# Patient Record
Sex: FEMALE | Race: WHITE | Employment: FULL TIME | URBAN - METROPOLITAN AREA
[De-identification: names, ages, dates, MRNs, and addresses within clinical notes are randomized per-mention and may not be internally consistent; named-entity substitution may affect disease eponyms.]

---

## 2018-10-17 NOTE — PROGRESS NOTES
Genny Johnanna Small    Age 60 y.o.    female    1955    MRN [id number]    Date___________   Arrival Time_____________  OR Time____________Duration____     Procedure(s):  PHACOEMULSIFICATION OF CATARACT RIGHT EYE WITH INTRAOCULAR LENS IMPLANT    Surgeon  ________________________________  Floretta Kocher   General   [place]       Phone [phone] (home) [phone] (work)      [address]  Cell Work  ______________________________________________________________________________________________________________________________________________________________________________________________________________________________________________________________________________________________________________________________________________________________    PCP__________________________Phone__________________________________      H&P__________________Bringing      Chart              Epic    DOS           Called_______  EKG__________________Bringing      Chart              Epic    DOS           Called_______  LAB__________________ Bringing      Chart              Epic    DOS           Called_______  CardiacClearance _______Bringing      Chart              Epic    DOS           Called_______      Cardiologist________________________ Phone___________________________      ? Restoration concerns / Waiver on Chart            PAT Communications________________  ? Pre-op Instructions Given [place]          _________________________________  ? Directions to Surgery Center                          _________________________________  ? Transportation Home_______________      _________________________________  ?  Crutches/Walker__________________        _________________________________      ________Pre-op Orders   _______Transcribed    _______Wt.  ________Pharmacy          _______SCD  ______VTE     ______Beta Blocker

## 2018-10-22 RX ORDER — LEVOTHYROXINE SODIUM 0.07 MG/1
TABLET ORAL
Refills: 2 | COMMUNITY
Start: 2018-09-06

## 2018-10-22 NOTE — PROGRESS NOTES
Obstructive Sleep Apnea (MICHELLE) Screening     Patient:  Sandra Baig    YOB: 1955      Medical Record #:  [id number]                     Date:  10/22/2018     1. Are you a loud and/or regular snorer? []  Yes       [x] No    2. Have you been observed to gasp or stop breathing during sleep? []  Yes       [x] No    3. Do you feel tired or groggy upon awakening or do you awaken with a headache?           []  Yes       [] No    4. Are you often tired or fatigued during the wake time hours? []  Yes       [] No    5. Do you fall asleep sitting, reading, watching TV or driving? []  Yes       [] No    6. Do you often have problems with memory or concentration? []  Yes       [] No    **If patient's score is ? 3 they are considered high risk for MICHELLE. Notify the anesthesiologist of the high risk and document in focus note. Note:  If the patient's BMI is more than 35 kg m¯² , has neck circumference > 40 cm, and/or high blood pressure the risk is greater (© American Sleep Apnea Association, 2006).

## 2018-10-24 ENCOUNTER — ANESTHESIA EVENT (OUTPATIENT)
Dept: OPERATING ROOM | Age: 63
End: 2018-10-24
Payer: COMMERCIAL

## 2018-10-25 ENCOUNTER — ANESTHESIA (OUTPATIENT)
Dept: OPERATING ROOM | Age: 63
End: 2018-10-25
Payer: COMMERCIAL

## 2018-10-25 ENCOUNTER — HOSPITAL ENCOUNTER (OUTPATIENT)
Age: 63
Setting detail: OUTPATIENT SURGERY
Discharge: HOME | End: 2018-10-25
Attending: OPHTHALMOLOGY | Admitting: OPHTHALMOLOGY
Payer: COMMERCIAL

## 2018-10-25 VITALS — SYSTOLIC BLOOD PRESSURE: 154 MMHG | DIASTOLIC BLOOD PRESSURE: 79 MMHG | OXYGEN SATURATION: 98 %

## 2018-10-25 VITALS
DIASTOLIC BLOOD PRESSURE: 69 MMHG | RESPIRATION RATE: 16 BRPM | SYSTOLIC BLOOD PRESSURE: 129 MMHG | TEMPERATURE: 97.1 F | WEIGHT: 168 LBS | HEART RATE: 80 BPM | BODY MASS INDEX: 27 KG/M2 | HEIGHT: 66 IN | OXYGEN SATURATION: 93 %

## 2018-10-25 LAB
GLUCOSE BLD-MCNC: 209 MG/DL (ref 70–99)
PERFORMED ON: ABNORMAL

## 2018-10-25 PROCEDURE — 3700000001 HC ADD 15 MINUTES (ANESTHESIA): Performed by: OPHTHALMOLOGY

## 2018-10-25 PROCEDURE — 7100000011 HC PHASE II RECOVERY - ADDTL 15 MIN: Performed by: OPHTHALMOLOGY

## 2018-10-25 PROCEDURE — 6360000002 HC RX W HCPCS: Performed by: NURSE ANESTHETIST, CERTIFIED REGISTERED

## 2018-10-25 PROCEDURE — 2500000003 HC RX 250 WO HCPCS: Performed by: OPHTHALMOLOGY

## 2018-10-25 PROCEDURE — 3600000003 HC SURGERY LEVEL 3 BASE: Performed by: OPHTHALMOLOGY

## 2018-10-25 PROCEDURE — 2580000003 HC RX 258: Performed by: ANESTHESIOLOGY

## 2018-10-25 PROCEDURE — 7100000010 HC PHASE II RECOVERY - FIRST 15 MIN: Performed by: OPHTHALMOLOGY

## 2018-10-25 PROCEDURE — 3700000000 HC ANESTHESIA ATTENDED CARE: Performed by: OPHTHALMOLOGY

## 2018-10-25 PROCEDURE — 6360000002 HC RX W HCPCS: Performed by: OPHTHALMOLOGY

## 2018-10-25 PROCEDURE — V2632 POST CHMBR INTRAOCULAR LENS: HCPCS | Performed by: OPHTHALMOLOGY

## 2018-10-25 PROCEDURE — 3600000013 HC SURGERY LEVEL 3 ADDTL 15MIN: Performed by: OPHTHALMOLOGY

## 2018-10-25 PROCEDURE — 2580000003 HC RX 258: Performed by: OPHTHALMOLOGY

## 2018-10-25 PROCEDURE — 2709999900 HC NON-CHARGEABLE SUPPLY: Performed by: OPHTHALMOLOGY

## 2018-10-25 PROCEDURE — 6370000000 HC RX 637 (ALT 250 FOR IP): Performed by: OPHTHALMOLOGY

## 2018-10-25 DEVICE — ACRYSOF(R) IQ ASPHERIC IOL SP ACRYLIC FOLDABLELENS WULTRASERT(TM) DELIVERY SYSTEM UV WBLUE LIGHT FILTER. 13.0MM LENGTH 6.0MM ANTERIORASYMMETRIC BICONVEX OPTIC PLANAR HAPTICS.
Type: IMPLANTABLE DEVICE | Site: LENS | Status: FUNCTIONAL
Brand: ACRYSOF ULTRASERT

## 2018-10-25 RX ORDER — FENTANYL CITRATE 50 UG/ML
INJECTION, SOLUTION INTRAMUSCULAR; INTRAVENOUS PRN
Status: DISCONTINUED | OUTPATIENT
Start: 2018-10-25 | End: 2018-10-25 | Stop reason: SDUPTHER

## 2018-10-25 RX ORDER — SODIUM CHLORIDE 0.9 % (FLUSH) 0.9 %
10 SYRINGE (ML) INJECTION PRN
Status: DISCONTINUED | OUTPATIENT
Start: 2018-10-25 | End: 2018-10-25 | Stop reason: HOSPADM

## 2018-10-25 RX ORDER — SODIUM CHLORIDE 0.9 % (FLUSH) 0.9 %
10 SYRINGE (ML) INJECTION EVERY 12 HOURS SCHEDULED
Status: DISCONTINUED | OUTPATIENT
Start: 2018-10-25 | End: 2018-10-25 | Stop reason: HOSPADM

## 2018-10-25 RX ORDER — SODIUM CHLORIDE, SODIUM LACTATE, POTASSIUM CHLORIDE, CALCIUM CHLORIDE 600; 310; 30; 20 MG/100ML; MG/100ML; MG/100ML; MG/100ML
INJECTION, SOLUTION INTRAVENOUS CONTINUOUS
Status: DISCONTINUED | OUTPATIENT
Start: 2018-10-25 | End: 2018-10-25 | Stop reason: HOSPADM

## 2018-10-25 RX ORDER — LIDOCAINE HYDROCHLORIDE 10 MG/ML
1 INJECTION, SOLUTION EPIDURAL; INFILTRATION; INTRACAUDAL; PERINEURAL
Status: DISCONTINUED | OUTPATIENT
Start: 2018-10-25 | End: 2018-10-25 | Stop reason: HOSPADM

## 2018-10-25 RX ORDER — MIDAZOLAM HYDROCHLORIDE 1 MG/ML
INJECTION INTRAMUSCULAR; INTRAVENOUS PRN
Status: DISCONTINUED | OUTPATIENT
Start: 2018-10-25 | End: 2018-10-25 | Stop reason: SDUPTHER

## 2018-10-25 RX ORDER — MAGNESIUM HYDROXIDE 1200 MG/15ML
LIQUID ORAL PRN
Status: DISCONTINUED | OUTPATIENT
Start: 2018-10-25 | End: 2018-10-25 | Stop reason: HOSPADM

## 2018-10-25 RX ADMIN — Medication 0.3 ML: at 06:40

## 2018-10-25 RX ADMIN — Medication 0.3 ML: at 06:49

## 2018-10-25 RX ADMIN — MIDAZOLAM HYDROCHLORIDE 2 MG: 2 INJECTION, SOLUTION INTRAMUSCULAR; INTRAVENOUS at 07:24

## 2018-10-25 RX ADMIN — SODIUM CHLORIDE, POTASSIUM CHLORIDE, SODIUM LACTATE AND CALCIUM CHLORIDE: 600; 310; 30; 20 INJECTION, SOLUTION INTRAVENOUS at 06:43

## 2018-10-25 RX ADMIN — Medication 0.3 ML: at 06:31

## 2018-10-25 RX ADMIN — FENTANYL CITRATE 50 MCG: 50 INJECTION INTRAMUSCULAR; INTRAVENOUS at 07:24

## 2018-10-25 ASSESSMENT — PULMONARY FUNCTION TESTS
PIF_VALUE: 0

## 2018-10-25 ASSESSMENT — PAIN SCALES - GENERAL: PAINLEVEL_OUTOF10: 0

## 2018-10-25 ASSESSMENT — PAIN - FUNCTIONAL ASSESSMENT: PAIN_FUNCTIONAL_ASSESSMENT: 0-10

## 2018-10-25 NOTE — PROGRESS NOTES
POCT      Blood Glucose:      (Normal Range 70-99)  209    PT:      (Normal Range 9.8 - 13)    INR:      (Normal Range 0.86-1.14)

## 2018-10-25 NOTE — ANESTHESIA PRE PROCEDURE
Department of Anesthesiology  Preprocedure Note       Name:  Humaira Hilliard   Age:  60 y.o.  :  1955                                          MRN:  [id number]         Date:  10/25/2018      Surgeon: Terry Stephens):  Nidia Nice MD    Procedure: PHACOEMULSIFICATION OF CATARACT RIGHT EYE WITH INTRAOCULAR LENS IMPLANT (Right Eye)    Medications prior to admission:   Prior to Admission medications    Medication Sig Start Date End Date Taking? Authorizing Provider   levothyroxine (SYNTHROID) 75 MCG tablet TAKE ONE TABLET BY MOUTH EVERY DAY 18  Yes Historical Provider, MD   metFORMIN (GLUCOPHAGE) 1000 MG tablet Take 1,000 mg by mouth 2 times daily (with meals). Yes Historical Provider, MD   Canagliflozin (INVOKANA) 300 MG TABS Take  by mouth. Yes Historical Provider, MD   ALPRAZolam Wileen Ao) 1 MG tablet Take 1 mg by mouth nightly as needed for Sleep. Yes Historical Provider, MD       Current medications:    Current Facility-Administered Medications   Medication Dose Route Frequency Provider Last Rate Last Dose    lactated ringers infusion   Intravenous Continuous Leon Blackburn  mL/hr at 10/25/18 2818      sodium chloride flush 0.9 % injection 10 mL  10 mL Intravenous 2 times per day Leon Blackburn MD        sodium chloride flush 0.9 % injection 10 mL  10 mL Intravenous PRN Leon Blackburn MD        lidocaine PF 1 % injection 1 mL  1 mL Intradermal Once PRN Leon Blackburn MD        epinephrine and lidocaine 2% PF in BSS injection (1 mL)   Intraocular Once Nidia Nice MD           Allergies: Allergies   Allergen Reactions    Latex      Added based on information entered during case entry, please review and add reactions, type, and severity as needed    Codeine     Pcn [Penicillins]        Problem List:  There is no problem list on file for this patient.       Past Medical History:        Diagnosis Date    Diabetes mellitus ([address]     Hyperlipidemia     Thyroid

## 2018-10-31 ENCOUNTER — ANESTHESIA EVENT (OUTPATIENT)
Dept: OPERATING ROOM | Age: 63
End: 2018-10-31
Payer: COMMERCIAL

## 2018-11-01 ENCOUNTER — HOSPITAL ENCOUNTER (OUTPATIENT)
Age: 63
Setting detail: OUTPATIENT SURGERY
Discharge: HOME | End: 2018-11-01
Attending: OPHTHALMOLOGY | Admitting: OPHTHALMOLOGY
Payer: COMMERCIAL

## 2018-11-01 ENCOUNTER — ANESTHESIA (OUTPATIENT)
Dept: OPERATING ROOM | Age: 63
End: 2018-11-01
Payer: COMMERCIAL

## 2018-11-01 VITALS
OXYGEN SATURATION: 96 % | SYSTOLIC BLOOD PRESSURE: 147 MMHG | HEART RATE: 73 BPM | BODY MASS INDEX: 27 KG/M2 | HEIGHT: 66 IN | RESPIRATION RATE: 17 BRPM | TEMPERATURE: 96.8 F | DIASTOLIC BLOOD PRESSURE: 67 MMHG | WEIGHT: 168 LBS

## 2018-11-01 VITALS — DIASTOLIC BLOOD PRESSURE: 78 MMHG | OXYGEN SATURATION: 99 % | SYSTOLIC BLOOD PRESSURE: 151 MMHG

## 2018-11-01 LAB
GLUCOSE BLD-MCNC: 189 MG/DL (ref 70–99)
PERFORMED ON: ABNORMAL

## 2018-11-01 PROCEDURE — 6360000002 HC RX W HCPCS: Performed by: OPHTHALMOLOGY

## 2018-11-01 PROCEDURE — 2500000003 HC RX 250 WO HCPCS: Performed by: OPHTHALMOLOGY

## 2018-11-01 PROCEDURE — 3700000000 HC ANESTHESIA ATTENDED CARE: Performed by: OPHTHALMOLOGY

## 2018-11-01 PROCEDURE — 3700000001 HC ADD 15 MINUTES (ANESTHESIA): Performed by: OPHTHALMOLOGY

## 2018-11-01 PROCEDURE — 2580000003 HC RX 258: Performed by: ANESTHESIOLOGY

## 2018-11-01 PROCEDURE — 6360000002 HC RX W HCPCS: Performed by: NURSE ANESTHETIST, CERTIFIED REGISTERED

## 2018-11-01 PROCEDURE — 7100000010 HC PHASE II RECOVERY - FIRST 15 MIN: Performed by: OPHTHALMOLOGY

## 2018-11-01 PROCEDURE — 6370000000 HC RX 637 (ALT 250 FOR IP): Performed by: OPHTHALMOLOGY

## 2018-11-01 PROCEDURE — 6370000000 HC RX 637 (ALT 250 FOR IP)

## 2018-11-01 PROCEDURE — 3600000013 HC SURGERY LEVEL 3 ADDTL 15MIN: Performed by: OPHTHALMOLOGY

## 2018-11-01 PROCEDURE — 3600000003 HC SURGERY LEVEL 3 BASE: Performed by: OPHTHALMOLOGY

## 2018-11-01 PROCEDURE — 2580000003 HC RX 258: Performed by: OPHTHALMOLOGY

## 2018-11-01 PROCEDURE — V2632 POST CHMBR INTRAOCULAR LENS: HCPCS | Performed by: OPHTHALMOLOGY

## 2018-11-01 PROCEDURE — 2709999900 HC NON-CHARGEABLE SUPPLY: Performed by: OPHTHALMOLOGY

## 2018-11-01 DEVICE — ACRYSOF(R) IQ ASPHERIC IOL SP ACRYLIC FOLDABLELENS WULTRASERT(TM) DELIVERY SYSTEM UV WBLUE LIGHT FILTER. 13.0MM LENGTH 6.0MM ANTERIORASYMMETRIC BICONVEX OPTIC PLANAR HAPTICS.
Type: IMPLANTABLE DEVICE | Status: FUNCTIONAL
Brand: ACRYSOF ULTRASERT

## 2018-11-01 RX ORDER — SODIUM CHLORIDE 0.9 % (FLUSH) 0.9 %
10 SYRINGE (ML) INJECTION EVERY 12 HOURS SCHEDULED
Status: DISCONTINUED | OUTPATIENT
Start: 2018-11-01 | End: 2018-11-01 | Stop reason: HOSPADM

## 2018-11-01 RX ORDER — SODIUM CHLORIDE 0.9 % (FLUSH) 0.9 %
10 SYRINGE (ML) INJECTION PRN
Status: DISCONTINUED | OUTPATIENT
Start: 2018-11-01 | End: 2018-11-01 | Stop reason: HOSPADM

## 2018-11-01 RX ORDER — TETRACAINE HYDROCHLORIDE 5 MG/ML
SOLUTION OPHTHALMIC PRN
Status: DISCONTINUED | OUTPATIENT
Start: 2018-11-01 | End: 2018-11-01 | Stop reason: HOSPADM

## 2018-11-01 RX ORDER — FENTANYL CITRATE 50 UG/ML
INJECTION, SOLUTION INTRAMUSCULAR; INTRAVENOUS PRN
Status: DISCONTINUED | OUTPATIENT
Start: 2018-11-01 | End: 2018-11-01 | Stop reason: SDUPTHER

## 2018-11-01 RX ORDER — SODIUM CHLORIDE 9 MG/ML
INJECTION, SOLUTION INTRAVENOUS CONTINUOUS
Status: DISCONTINUED | OUTPATIENT
Start: 2018-11-01 | End: 2018-11-01

## 2018-11-01 RX ORDER — MIDAZOLAM HYDROCHLORIDE 1 MG/ML
INJECTION INTRAMUSCULAR; INTRAVENOUS PRN
Status: DISCONTINUED | OUTPATIENT
Start: 2018-11-01 | End: 2018-11-01 | Stop reason: SDUPTHER

## 2018-11-01 RX ORDER — MAGNESIUM HYDROXIDE 1200 MG/15ML
LIQUID ORAL PRN
Status: DISCONTINUED | OUTPATIENT
Start: 2018-11-01 | End: 2018-11-01 | Stop reason: HOSPADM

## 2018-11-01 RX ORDER — SODIUM CHLORIDE, SODIUM LACTATE, POTASSIUM CHLORIDE, CALCIUM CHLORIDE 600; 310; 30; 20 MG/100ML; MG/100ML; MG/100ML; MG/100ML
INJECTION, SOLUTION INTRAVENOUS CONTINUOUS
Status: DISCONTINUED | OUTPATIENT
Start: 2018-11-01 | End: 2018-11-01 | Stop reason: HOSPADM

## 2018-11-01 RX ADMIN — FENTANYL CITRATE 50 MCG: 50 INJECTION INTRAMUSCULAR; INTRAVENOUS at 07:13

## 2018-11-01 RX ADMIN — Medication 0.3 ML: at 06:32

## 2018-11-01 RX ADMIN — MIDAZOLAM HYDROCHLORIDE 2 MG: 2 INJECTION, SOLUTION INTRAMUSCULAR; INTRAVENOUS at 07:13

## 2018-11-01 RX ADMIN — SODIUM CHLORIDE, POTASSIUM CHLORIDE, SODIUM LACTATE AND CALCIUM CHLORIDE: 600; 310; 30; 20 INJECTION, SOLUTION INTRAVENOUS at 06:49

## 2018-11-01 ASSESSMENT — PAIN SCALES - GENERAL: PAINLEVEL_OUTOF10: 0

## 2018-11-01 ASSESSMENT — PULMONARY FUNCTION TESTS
PIF_VALUE: 0
PIF_VALUE: 3

## 2018-11-01 ASSESSMENT — PAIN - FUNCTIONAL ASSESSMENT: PAIN_FUNCTIONAL_ASSESSMENT: 0-10

## 2018-11-06 NOTE — H&P
Dewey Pierson is a 60y.o. year old who presents for elective outpatient ophthalmic surgery with Juan Jose Doty. The patient complains of decreased vision, glare and halos around lights, and trouble with vision for activities of daily living. Past Medical History:   Diagnosis Date    Diabetes mellitus ([address]     Hyperlipidemia     Thyroid disease        Past Surgical History:   Procedure Laterality Date    CARPAL TUNNEL RELEASE      CHOLECYSTECTOMY      [place] REMV CATARACT EXTRACAP,INSERT LENS Right 10/25/2018    PHACOEMULSIFICATION OF CATARACT RIGHT EYE WITH INTRAOCULAR LENS IMPLANT performed by Juan Jose Doty MD at [address] [facility] [address] Left 11/1/2018    PHACOEMULSIFICATION OF CATARACT LEFT EYE WITH INTRAOCULAR LENS IMPLANT performed by Juan Jose Doty MD at [address] [facility]         No current facility-administered medications for this encounter. Current Outpatient Prescriptions:     levothyroxine (SYNTHROID) 75 MCG tablet, TAKE ONE TABLET BY MOUTH EVERY DAY, Disp: , Rfl: 2    metFORMIN (GLUCOPHAGE) 1000 MG tablet, Take 1,000 mg by mouth 2 times daily (with meals). , Disp: , Rfl:     Canagliflozin (INVOKANA) 300 MG TABS, Take  by mouth., Disp: , Rfl:     ALPRAZolam (XANAX) 1 MG tablet, Take 1 mg by mouth nightly as needed for Sleep., Disp: , Rfl:     Scheduled Meds:    PRN Meds:. Allergies   Allergen Reactions    Latex      Added based on information entered during case entry, please review and add reactions, type, and severity as needed    Codeine     Pcn [Penicillins]          PHYSICAL EXAMINATION    Vitals:    11/01/18 0734   BP: (!) 147/67   Pulse: 73   Resp: 17   Temp: 96.8 °F (36 °C)   SpO2: 96%       Gen: NAD  HEENT: OP clear, TM clear bilaterally; see outpatient ophthalmology record  Pulm: CTA B  Heart: RRR, no C/M/R/G  Abd: S/NT/ND  Ext: WWP, no C/C/E  Neuro: no focal defecits    Assessment:   1.

## 2019-01-17 ENCOUNTER — OFFICE VISIT (OUTPATIENT)
Dept: ORTHOPEDIC SURGERY | Age: 64
End: 2019-01-17
Payer: COMMERCIAL

## 2019-01-17 VITALS
DIASTOLIC BLOOD PRESSURE: 84 MMHG | WEIGHT: 167.99 LBS | HEART RATE: 78 BPM | SYSTOLIC BLOOD PRESSURE: 154 MMHG | BODY MASS INDEX: 27 KG/M2 | HEIGHT: 66 IN

## 2019-01-17 DIAGNOSIS — M25.512 LEFT SHOULDER PAIN, UNSPECIFIED CHRONICITY: Primary | ICD-10-CM

## 2019-01-17 DIAGNOSIS — M75.41 IMPINGEMENT SYNDROME OF RIGHT SHOULDER: ICD-10-CM

## 2019-01-17 DIAGNOSIS — S46.012A STRAIN OF LEFT ROTATOR CUFF CAPSULE, INITIAL ENCOUNTER: ICD-10-CM

## 2019-01-17 DIAGNOSIS — M25.511 RIGHT SHOULDER PAIN, UNSPECIFIED CHRONICITY: ICD-10-CM

## 2019-01-17 PROCEDURE — G8484 FLU IMMUNIZE NO ADMIN: HCPCS | Performed by: ORTHOPAEDIC SURGERY

## 2019-01-17 PROCEDURE — 99243 OFF/OP CNSLTJ NEW/EST LOW 30: CPT | Performed by: ORTHOPAEDIC SURGERY

## 2019-01-17 PROCEDURE — G8419 CALC BMI OUT NRM PARAM NOF/U: HCPCS | Performed by: ORTHOPAEDIC SURGERY

## 2019-01-17 PROCEDURE — 3017F COLORECTAL CA SCREEN DOC REV: CPT | Performed by: ORTHOPAEDIC SURGERY

## 2019-01-17 PROCEDURE — G8427 DOCREV CUR MEDS BY ELIG CLIN: HCPCS | Performed by: ORTHOPAEDIC SURGERY

## 2019-01-31 ENCOUNTER — CLINICAL DOCUMENTATION (OUTPATIENT)
Dept: ORTHOPEDIC SURGERY | Age: 64
End: 2019-01-31

## 2019-02-28 RX ORDER — MELOXICAM 15 MG/1
TABLET ORAL
Qty: 90 TABLET | Refills: 0 | Status: SHIPPED | OUTPATIENT
Start: 2019-02-28 | End: 2021-03-11

## 2020-06-11 ENCOUNTER — HOSPITAL ENCOUNTER (INPATIENT)
Age: 65
LOS: 2 days | Discharge: HOME | DRG: 253 | End: 2020-06-13
Attending: INTERNAL MEDICINE | Admitting: INTERNAL MEDICINE
Payer: MEDICARE

## 2020-06-11 PROBLEM — E11.628 DIABETIC FOOT INFECTION (HCC): Status: ACTIVE | Noted: 2020-06-11

## 2020-06-11 PROBLEM — L08.9 DIABETIC FOOT INFECTION (HCC): Status: ACTIVE | Noted: 2020-06-11

## 2020-06-11 LAB
GLUCOSE BLD-MCNC: 125 MG/DL (ref 70–99)
PERFORMED ON: ABNORMAL

## 2020-06-11 PROCEDURE — 2580000003 HC RX 258: Performed by: STUDENT IN AN ORGANIZED HEALTH CARE EDUCATION/TRAINING PROGRAM

## 2020-06-11 PROCEDURE — 83036 HEMOGLOBIN GLYCOSYLATED A1C: CPT

## 2020-06-11 PROCEDURE — 83721 ASSAY OF BLOOD LIPOPROTEIN: CPT

## 2020-06-11 PROCEDURE — 80061 LIPID PANEL: CPT

## 2020-06-11 PROCEDURE — 1200000000 HC SEMI PRIVATE

## 2020-06-11 PROCEDURE — 80053 COMPREHEN METABOLIC PANEL: CPT

## 2020-06-11 RX ORDER — ACETAMINOPHEN 650 MG/1
650 SUPPOSITORY RECTAL EVERY 6 HOURS PRN
Status: DISCONTINUED | OUTPATIENT
Start: 2020-06-11 | End: 2020-06-13 | Stop reason: HOSPADM

## 2020-06-11 RX ORDER — SODIUM CHLORIDE 0.9 % (FLUSH) 0.9 %
10 SYRINGE (ML) INJECTION EVERY 12 HOURS SCHEDULED
Status: DISCONTINUED | OUTPATIENT
Start: 2020-06-11 | End: 2020-06-13 | Stop reason: HOSPADM

## 2020-06-11 RX ORDER — PROMETHAZINE HYDROCHLORIDE 25 MG/1
12.5 TABLET ORAL EVERY 6 HOURS PRN
Status: DISCONTINUED | OUTPATIENT
Start: 2020-06-11 | End: 2020-06-13 | Stop reason: HOSPADM

## 2020-06-11 RX ORDER — POLYETHYLENE GLYCOL 3350 17 G/17G
17 POWDER, FOR SOLUTION ORAL DAILY PRN
Status: DISCONTINUED | OUTPATIENT
Start: 2020-06-11 | End: 2020-06-13 | Stop reason: HOSPADM

## 2020-06-11 RX ORDER — SODIUM CHLORIDE 0.9 % (FLUSH) 0.9 %
10 SYRINGE (ML) INJECTION PRN
Status: DISCONTINUED | OUTPATIENT
Start: 2020-06-11 | End: 2020-06-13 | Stop reason: HOSPADM

## 2020-06-11 RX ORDER — ASPIRIN 81 MG/1
81 TABLET ORAL DAILY
Status: DISCONTINUED | OUTPATIENT
Start: 2020-06-12 | End: 2020-06-12

## 2020-06-11 RX ORDER — ONDANSETRON 2 MG/ML
4 INJECTION INTRAMUSCULAR; INTRAVENOUS EVERY 6 HOURS PRN
Status: DISCONTINUED | OUTPATIENT
Start: 2020-06-11 | End: 2020-06-13 | Stop reason: HOSPADM

## 2020-06-11 RX ORDER — ACETAMINOPHEN 325 MG/1
650 TABLET ORAL EVERY 6 HOURS PRN
Status: DISCONTINUED | OUTPATIENT
Start: 2020-06-11 | End: 2020-06-13 | Stop reason: HOSPADM

## 2020-06-11 RX ORDER — LEVOTHYROXINE SODIUM 0.07 MG/1
75 TABLET ORAL
Status: DISCONTINUED | OUTPATIENT
Start: 2020-06-12 | End: 2020-06-13 | Stop reason: HOSPADM

## 2020-06-11 RX ADMIN — Medication 10 ML: at 23:33

## 2020-06-11 ASSESSMENT — PAIN SCALES - GENERAL
PAINLEVEL_OUTOF10: 0
PAINLEVEL_OUTOF10: 0

## 2020-06-12 ENCOUNTER — APPOINTMENT (OUTPATIENT)
Dept: GENERAL RADIOLOGY | Age: 65
DRG: 253 | End: 2020-06-12
Attending: INTERNAL MEDICINE
Payer: MEDICARE

## 2020-06-12 ENCOUNTER — APPOINTMENT (OUTPATIENT)
Dept: VASCULAR LAB | Age: 65
DRG: 253 | End: 2020-06-12
Attending: INTERNAL MEDICINE
Payer: MEDICARE

## 2020-06-12 ENCOUNTER — APPOINTMENT (OUTPATIENT)
Dept: INTERVENTIONAL RADIOLOGY/VASCULAR | Age: 65
DRG: 253 | End: 2020-06-12
Attending: INTERNAL MEDICINE
Payer: MEDICARE

## 2020-06-12 LAB
A/G RATIO: 1.5 (ref 1.1–2.2)
ALBUMIN SERPL-MCNC: 4.4 G/DL (ref 3.4–5)
ALP BLD-CCNC: 89 U/L (ref 40–129)
ALT SERPL-CCNC: 12 U/L (ref 10–40)
ANION GAP SERPL CALCULATED.3IONS-SCNC: 12 MMOL/L (ref 3–16)
ANION GAP SERPL CALCULATED.3IONS-SCNC: 13 MMOL/L (ref 3–16)
AST SERPL-CCNC: 21 U/L (ref 15–37)
BASOPHILS ABSOLUTE: 0.1 K/UL (ref 0–0.2)
BASOPHILS RELATIVE PERCENT: 0.8 %
BILIRUB SERPL-MCNC: 0.4 MG/DL (ref 0–1)
BUN BLDV-MCNC: 16 MG/DL (ref 7–20)
BUN BLDV-MCNC: 17 MG/DL (ref 7–20)
C-REACTIVE PROTEIN: 4.6 MG/L (ref 0–5.1)
CALCIUM SERPL-MCNC: 10.2 MG/DL (ref 8.3–10.6)
CALCIUM SERPL-MCNC: 9.9 MG/DL (ref 8.3–10.6)
CHLORIDE BLD-SCNC: 101 MMOL/L (ref 99–110)
CHLORIDE BLD-SCNC: 104 MMOL/L (ref 99–110)
CO2: 23 MMOL/L (ref 21–32)
CO2: 24 MMOL/L (ref 21–32)
CREAT SERPL-MCNC: 0.6 MG/DL (ref 0.6–1.2)
CREAT SERPL-MCNC: 0.6 MG/DL (ref 0.6–1.2)
EOSINOPHILS ABSOLUTE: 0.1 K/UL (ref 0–0.6)
EOSINOPHILS RELATIVE PERCENT: 1.7 %
ESTIMATED AVERAGE GLUCOSE: 182.9 MG/DL
GFR AFRICAN AMERICAN: >60
GFR AFRICAN AMERICAN: >60
GFR NON-AFRICAN AMERICAN: >60
GFR NON-AFRICAN AMERICAN: >60
GLOBULIN: 2.9 G/DL
GLUCOSE BLD-MCNC: 105 MG/DL (ref 70–99)
GLUCOSE BLD-MCNC: 120 MG/DL (ref 70–99)
GLUCOSE BLD-MCNC: 127 MG/DL (ref 70–99)
GLUCOSE BLD-MCNC: 149 MG/DL (ref 70–99)
GLUCOSE BLD-MCNC: 168 MG/DL (ref 70–99)
GLUCOSE BLD-MCNC: 181 MG/DL (ref 70–99)
HBA1C MFR BLD: 8 %
HCT VFR BLD CALC: 47.3 % (ref 36–48)
HEMOGLOBIN: 16.2 G/DL (ref 12–16)
LYMPHOCYTES ABSOLUTE: 2.1 K/UL (ref 1–5.1)
LYMPHOCYTES RELATIVE PERCENT: 23.2 %
MCH RBC QN AUTO: 31.1 PG (ref 26–34)
MCHC RBC AUTO-ENTMCNC: 34.3 G/DL (ref 31–36)
MCV RBC AUTO: 90.7 FL (ref 80–100)
MONOCYTES ABSOLUTE: 0.6 K/UL (ref 0–1.3)
MONOCYTES RELATIVE PERCENT: 6.5 %
NEUTROPHILS ABSOLUTE: 6.1 K/UL (ref 1.7–7.7)
NEUTROPHILS RELATIVE PERCENT: 67.8 %
PDW BLD-RTO: 13.9 % (ref 12.4–15.4)
PERFORMED ON: ABNORMAL
PLATELET # BLD: 221 K/UL (ref 135–450)
PMV BLD AUTO: 7.7 FL (ref 5–10.5)
POTASSIUM REFLEX MAGNESIUM: 3.9 MMOL/L (ref 3.5–5.1)
POTASSIUM REFLEX MAGNESIUM: 4.5 MMOL/L (ref 3.5–5.1)
RBC # BLD: 5.21 M/UL (ref 4–5.2)
SARS-COV-2, NAAT: NOT DETECTED
SEDIMENTATION RATE, ERYTHROCYTE: 17 MM/HR (ref 0–30)
SODIUM BLD-SCNC: 138 MMOL/L (ref 136–145)
SODIUM BLD-SCNC: 139 MMOL/L (ref 136–145)
T3 TOTAL: 1.04 NG/ML (ref 0.8–2)
T4 FREE: 1.4 NG/DL (ref 0.9–1.8)
TOTAL PROTEIN: 7.3 G/DL (ref 6.4–8.2)
TSH REFLEX: 0.25 UIU/ML (ref 0.27–4.2)
WBC # BLD: 9 K/UL (ref 4–11)

## 2020-06-12 PROCEDURE — 99222 1ST HOSP IP/OBS MODERATE 55: CPT | Performed by: SURGERY

## 2020-06-12 PROCEDURE — B41G1ZZ FLUOROSCOPY OF LEFT LOWER EXTREMITY ARTERIES USING LOW OSMOLAR CONTRAST: ICD-10-PCS | Performed by: SURGERY

## 2020-06-12 PROCEDURE — C1760 CLOSURE DEV, VASC: HCPCS

## 2020-06-12 PROCEDURE — 84439 ASSAY OF FREE THYROXINE: CPT

## 2020-06-12 PROCEDURE — 99152 MOD SED SAME PHYS/QHP 5/>YRS: CPT | Performed by: SURGERY

## 2020-06-12 PROCEDURE — 37228 PR REVSC OPN/PRQ TIB/PERO W/ANGIOPLASTY UNI: CPT | Performed by: SURGERY

## 2020-06-12 PROCEDURE — 99153 MOD SED SAME PHYS/QHP EA: CPT

## 2020-06-12 PROCEDURE — 99152 MOD SED SAME PHYS/QHP 5/>YRS: CPT

## 2020-06-12 PROCEDURE — 047H3ZZ DILATION OF RIGHT EXTERNAL ILIAC ARTERY, PERCUTANEOUS APPROACH: ICD-10-PCS | Performed by: SURGERY

## 2020-06-12 PROCEDURE — B4101ZZ FLUOROSCOPY OF ABDOMINAL AORTA USING LOW OSMOLAR CONTRAST: ICD-10-PCS | Performed by: SURGERY

## 2020-06-12 PROCEDURE — C2623 CATH, TRANSLUMIN, DRUG-COAT: HCPCS

## 2020-06-12 PROCEDURE — 37224 HC FEM POP TERRITORY PLASTY: CPT

## 2020-06-12 PROCEDURE — 6360000002 HC RX W HCPCS: Performed by: STUDENT IN AN ORGANIZED HEALTH CARE EDUCATION/TRAINING PROGRAM

## 2020-06-12 PROCEDURE — C1725 CATH, TRANSLUMIN NON-LASER: HCPCS

## 2020-06-12 PROCEDURE — 84443 ASSAY THYROID STIM HORMONE: CPT

## 2020-06-12 PROCEDURE — 84480 ASSAY TRIIODOTHYRONINE (T3): CPT

## 2020-06-12 PROCEDURE — 37220 HC ILIAC TERRITORY PLASTY: CPT

## 2020-06-12 PROCEDURE — C1769 GUIDE WIRE: HCPCS

## 2020-06-12 PROCEDURE — 37224 PR REVSC OPN/PRG FEM/POP W/ANGIOPLASTY UNI: CPT | Performed by: SURGERY

## 2020-06-12 PROCEDURE — 36415 COLL VENOUS BLD VENIPUNCTURE: CPT

## 2020-06-12 PROCEDURE — 2580000003 HC RX 258: Performed by: STUDENT IN AN ORGANIZED HEALTH CARE EDUCATION/TRAINING PROGRAM

## 2020-06-12 PROCEDURE — C1887 CATHETER, GUIDING: HCPCS

## 2020-06-12 PROCEDURE — 047L3ZZ DILATION OF LEFT FEMORAL ARTERY, PERCUTANEOUS APPROACH: ICD-10-PCS | Performed by: SURGERY

## 2020-06-12 PROCEDURE — 85025 COMPLETE CBC W/AUTO DIFF WBC: CPT

## 2020-06-12 PROCEDURE — 75716 ARTERY X-RAYS ARMS/LEGS: CPT

## 2020-06-12 PROCEDURE — 2580000003 HC RX 258: Performed by: SURGERY

## 2020-06-12 PROCEDURE — 6360000004 HC RX CONTRAST MEDICATION: Performed by: SURGERY

## 2020-06-12 PROCEDURE — 93926 LOWER EXTREMITY STUDY: CPT

## 2020-06-12 PROCEDURE — 75774 ARTERY X-RAY EACH VESSEL: CPT

## 2020-06-12 PROCEDURE — C1894 INTRO/SHEATH, NON-LASER: HCPCS

## 2020-06-12 PROCEDURE — 80048 BASIC METABOLIC PNL TOTAL CA: CPT

## 2020-06-12 PROCEDURE — 6370000000 HC RX 637 (ALT 250 FOR IP): Performed by: STUDENT IN AN ORGANIZED HEALTH CARE EDUCATION/TRAINING PROGRAM

## 2020-06-12 PROCEDURE — 1200000000 HC SEMI PRIVATE

## 2020-06-12 PROCEDURE — 047N3ZZ DILATION OF LEFT POPLITEAL ARTERY, PERCUTANEOUS APPROACH: ICD-10-PCS | Performed by: SURGERY

## 2020-06-12 PROCEDURE — U0002 COVID-19 LAB TEST NON-CDC: HCPCS

## 2020-06-12 PROCEDURE — 6370000000 HC RX 637 (ALT 250 FOR IP): Performed by: SURGERY

## 2020-06-12 PROCEDURE — 047N34Z DILATION OF LEFT POPLITEAL ARTERY WITH DRUG-ELUTING INTRALUMINAL DEVICE, PERCUTANEOUS APPROACH: ICD-10-PCS | Performed by: SURGERY

## 2020-06-12 PROCEDURE — 86140 C-REACTIVE PROTEIN: CPT

## 2020-06-12 PROCEDURE — 73630 X-RAY EXAM OF FOOT: CPT

## 2020-06-12 PROCEDURE — 75625 CONTRAST EXAM ABDOMINL AORTA: CPT | Performed by: SURGERY

## 2020-06-12 PROCEDURE — 37228 HC TIB PER TERRITORY PLASTY: CPT

## 2020-06-12 PROCEDURE — 37220 PR REVASCULARIZATION ILIAC ARTERY ANGIOP 1ST VSL: CPT | Performed by: SURGERY

## 2020-06-12 PROCEDURE — 75710 ARTERY X-RAYS ARM/LEG: CPT | Performed by: SURGERY

## 2020-06-12 RX ORDER — SODIUM CHLORIDE 9 MG/ML
INJECTION, SOLUTION INTRAVENOUS CONTINUOUS
Status: DISCONTINUED | OUTPATIENT
Start: 2020-06-12 | End: 2020-06-12

## 2020-06-12 RX ORDER — DEXTROSE MONOHYDRATE 25 G/50ML
12.5 INJECTION, SOLUTION INTRAVENOUS PRN
Status: DISCONTINUED | OUTPATIENT
Start: 2020-06-12 | End: 2020-06-13 | Stop reason: HOSPADM

## 2020-06-12 RX ORDER — ATORVASTATIN CALCIUM 20 MG/1
20 TABLET, FILM COATED ORAL NIGHTLY
Status: DISCONTINUED | OUTPATIENT
Start: 2020-06-12 | End: 2020-06-12

## 2020-06-12 RX ORDER — INSULIN LISPRO 100 [IU]/ML
0-6 INJECTION, SOLUTION INTRAVENOUS; SUBCUTANEOUS
Status: DISCONTINUED | OUTPATIENT
Start: 2020-06-12 | End: 2020-06-13 | Stop reason: HOSPADM

## 2020-06-12 RX ORDER — ACETAMINOPHEN 325 MG/1
650 TABLET ORAL EVERY 4 HOURS PRN
Status: DISCONTINUED | OUTPATIENT
Start: 2020-06-12 | End: 2020-06-12

## 2020-06-12 RX ORDER — INSULIN LISPRO 100 [IU]/ML
0-3 INJECTION, SOLUTION INTRAVENOUS; SUBCUTANEOUS NIGHTLY
Status: DISCONTINUED | OUTPATIENT
Start: 2020-06-12 | End: 2020-06-13 | Stop reason: HOSPADM

## 2020-06-12 RX ORDER — CLONIDINE HYDROCHLORIDE 0.1 MG/1
0.1 TABLET ORAL
Status: DISCONTINUED | OUTPATIENT
Start: 2020-06-12 | End: 2020-06-13 | Stop reason: HOSPADM

## 2020-06-12 RX ORDER — SODIUM CHLORIDE 0.9 % (FLUSH) 0.9 %
10 SYRINGE (ML) INJECTION PRN
Status: DISCONTINUED | OUTPATIENT
Start: 2020-06-12 | End: 2020-06-12 | Stop reason: SDUPTHER

## 2020-06-12 RX ORDER — SODIUM CHLORIDE 0.9 % (FLUSH) 0.9 %
10 SYRINGE (ML) INJECTION EVERY 12 HOURS SCHEDULED
Status: DISCONTINUED | OUTPATIENT
Start: 2020-06-12 | End: 2020-06-12 | Stop reason: SDUPTHER

## 2020-06-12 RX ORDER — NICOTINE POLACRILEX 4 MG
15 LOZENGE BUCCAL PRN
Status: DISCONTINUED | OUTPATIENT
Start: 2020-06-12 | End: 2020-06-13 | Stop reason: HOSPADM

## 2020-06-12 RX ORDER — ATORVASTATIN CALCIUM 40 MG/1
40 TABLET, FILM COATED ORAL NIGHTLY
Status: DISCONTINUED | OUTPATIENT
Start: 2020-06-12 | End: 2020-06-13 | Stop reason: HOSPADM

## 2020-06-12 RX ORDER — IODIXANOL 320 MG/ML
150 INJECTION, SOLUTION INTRAVASCULAR
Status: COMPLETED | OUTPATIENT
Start: 2020-06-12 | End: 2020-06-12

## 2020-06-12 RX ORDER — LABETALOL 20 MG/4 ML (5 MG/ML) INTRAVENOUS SYRINGE
10
Status: DISCONTINUED | OUTPATIENT
Start: 2020-06-12 | End: 2020-06-13 | Stop reason: HOSPADM

## 2020-06-12 RX ORDER — CLOPIDOGREL BISULFATE 75 MG/1
75 TABLET ORAL DAILY
Status: DISCONTINUED | OUTPATIENT
Start: 2020-06-12 | End: 2020-06-13 | Stop reason: HOSPADM

## 2020-06-12 RX ORDER — HYDRALAZINE HYDROCHLORIDE 20 MG/ML
10 INJECTION INTRAMUSCULAR; INTRAVENOUS EVERY 6 HOURS PRN
Status: DISCONTINUED | OUTPATIENT
Start: 2020-06-12 | End: 2020-06-13 | Stop reason: HOSPADM

## 2020-06-12 RX ORDER — ASPIRIN 81 MG/1
81 TABLET ORAL DAILY
Status: DISCONTINUED | OUTPATIENT
Start: 2020-06-13 | End: 2020-06-13 | Stop reason: HOSPADM

## 2020-06-12 RX ORDER — SODIUM CHLORIDE 9 MG/ML
INJECTION, SOLUTION INTRAVENOUS CONTINUOUS
Status: DISCONTINUED | OUTPATIENT
Start: 2020-06-12 | End: 2020-06-13

## 2020-06-12 RX ORDER — DEXTROSE MONOHYDRATE 50 MG/ML
100 INJECTION, SOLUTION INTRAVENOUS PRN
Status: DISCONTINUED | OUTPATIENT
Start: 2020-06-12 | End: 2020-06-13 | Stop reason: HOSPADM

## 2020-06-12 RX ADMIN — ACETAMINOPHEN 650 MG: 325 TABLET ORAL at 18:05

## 2020-06-12 RX ADMIN — CLOPIDOGREL BISULFATE 75 MG: 75 TABLET ORAL at 17:57

## 2020-06-12 RX ADMIN — ATORVASTATIN CALCIUM 40 MG: 40 TABLET, FILM COATED ORAL at 21:02

## 2020-06-12 RX ADMIN — IODIXANOL 150 ML: 320 INJECTION, SOLUTION INTRAVASCULAR at 14:50

## 2020-06-12 RX ADMIN — ENOXAPARIN SODIUM 40 MG: 40 INJECTION SUBCUTANEOUS at 17:57

## 2020-06-12 RX ADMIN — SODIUM CHLORIDE: 9 INJECTION, SOLUTION INTRAVENOUS at 21:03

## 2020-06-12 RX ADMIN — HYDRALAZINE HYDROCHLORIDE 10 MG: 20 INJECTION INTRAMUSCULAR; INTRAVENOUS at 18:15

## 2020-06-12 RX ADMIN — VANCOMYCIN HYDROCHLORIDE 1250 MG: 10 INJECTION, POWDER, LYOPHILIZED, FOR SOLUTION INTRAVENOUS at 18:20

## 2020-06-12 RX ADMIN — SODIUM CHLORIDE: 9 INJECTION, SOLUTION INTRAVENOUS at 06:09

## 2020-06-12 RX ADMIN — LEVOTHYROXINE SODIUM 75 MCG: 0.07 TABLET ORAL at 06:53

## 2020-06-12 RX ADMIN — CLONIDINE HYDROCHLORIDE 0.1 MG: 0.1 TABLET ORAL at 21:02

## 2020-06-12 ASSESSMENT — PAIN - FUNCTIONAL ASSESSMENT: PAIN_FUNCTIONAL_ASSESSMENT: ACTIVITIES ARE NOT PREVENTED

## 2020-06-12 ASSESSMENT — PAIN DESCRIPTION - ONSET: ONSET: ON-GOING

## 2020-06-12 ASSESSMENT — PAIN DESCRIPTION - PAIN TYPE: TYPE: ACUTE PAIN;CHRONIC PAIN

## 2020-06-12 ASSESSMENT — PAIN SCALES - GENERAL
PAINLEVEL_OUTOF10: 0
PAINLEVEL_OUTOF10: 7
PAINLEVEL_OUTOF10: 0
PAINLEVEL_OUTOF10: 5
PAINLEVEL_OUTOF10: 0
PAINLEVEL_OUTOF10: 4
PAINLEVEL_OUTOF10: 0

## 2020-06-12 ASSESSMENT — ENCOUNTER SYMPTOMS
EYES NEGATIVE: 1
RESPIRATORY NEGATIVE: 1
BACK PAIN: 0
ALLERGIC/IMMUNOLOGIC NEGATIVE: 1
GASTROINTESTINAL NEGATIVE: 1

## 2020-06-12 ASSESSMENT — PAIN DESCRIPTION - LOCATION: LOCATION: BACK

## 2020-06-12 ASSESSMENT — PAIN DESCRIPTION - FREQUENCY: FREQUENCY: INTERMITTENT

## 2020-06-12 ASSESSMENT — PAIN DESCRIPTION - DESCRIPTORS: DESCRIPTORS: ACHING

## 2020-06-12 ASSESSMENT — PAIN DESCRIPTION - PROGRESSION: CLINICAL_PROGRESSION: GRADUALLY WORSENING

## 2020-06-12 ASSESSMENT — PAIN DESCRIPTION - ORIENTATION: ORIENTATION: LOWER

## 2020-06-12 NOTE — PLAN OF CARE
Problem: Falls - Risk of:  Goal: Will remain free from falls  Description: Will remain free from falls  Outcome: Ongoing     Problem: Pain:  Goal: Pain level will decrease  Description: Pain level will decrease  Outcome: Ongoing     Problem: Skin Integrity - Impaired:  Goal: Will show no infection signs and symptoms  Description: Will show no infection signs and symptoms  Outcome: Ongoing

## 2020-06-12 NOTE — CONSULTS
Department of Podiatry Consult Note  Resident       Reason for Consult: \"Left lower extremity cellulitis\"    Requesting Physician:  Dr. Valdez Sees:  Left foot pain    HISTORY OF PRESENT ILLNESS:                The patient is a 71 y.o. female with significant past medical history of thyroid disease, hyperlipidemia, and diabetes mellitus with peripheral neuropathy. Patient does not see a podiatrist regularly. Patient states she has had numbness, tingling, burning sensation to B/L LE, but left foot is worse. Patient states that she is first noticed the coldness, increased pain about 2 months ago and saw her primary care physician and he prescribed her gabapentin and oral antibiotics. Patient states the gabapentin has not helped with her pain. Over the last week the patient was worried that her left foot was hurting more and her toe to the left foot were turning white and felt cold. She went back to her primary care physician and was concerned about the color change to her left foot and and dopplerable pulses so was admitted to the hospital for further work-up and evaluation. Patient denies nausea, vomiting, fever, chills, shortness of breath, or other constitutional symptoms. Patient has no other pedal complaints during today's evaluation.         Past Medical History:        Diagnosis Date    Diabetes mellitus ([address]     Hyperlipidemia     Thyroid disease      Past Surgical History:        Procedure Laterality Date    CARPAL TUNNEL RELEASE      CHOLECYSTECTOMY      [place] REMV CATARACT EXTRACAP,INSERT LENS Right 10/25/2018    PHACOEMULSIFICATION OF CATARACT RIGHT EYE WITH INTRAOCULAR LENS IMPLANT performed by Sheri Burns MD at [address] [facility] Left 11/1/2018    PHACOEMULSIFICATION OF CATARACT LEFT EYE WITH INTRAOCULAR LENS IMPLANT performed by Sheri Burns MD at [address] [place] [address]       Current Medications: Current Facility-Administered Medications: aspirin EC tablet 325 mg, 325 mg, Oral, Daily  atorvastatin (LIPITOR) tablet 40 mg, 40 mg, Oral, Nightly  nicotine (NICODERM CQ) 7 MG/24HR 1 patch, 1 patch, Transdermal, Daily  insulin lispro (1 Unit Dial) 0-6 Units, 0-6 Units, Subcutaneous, TID WC  insulin lispro (1 Unit Dial) 0-3 Units, 0-3 Units, Subcutaneous, Nightly  glucose (GLUTOSE) 40 % oral gel 15 g, 15 g, Oral, PRN  dextrose 50 % IV solution, 12.5 g, Intravenous, PRN  glucagon (rDNA) injection 1 mg, 1 mg, Intramuscular, PRN  dextrose 5 % solution, 100 mL/hr, Intravenous, PRN  0.9 % sodium chloride infusion, , Intravenous, Continuous  levothyroxine (SYNTHROID) tablet 75 mcg, 75 mcg, Oral, QAM AC  sodium chloride flush 0.9 % injection 10 mL, 10 mL, Intravenous, 2 times per day  sodium chloride flush 0.9 % injection 10 mL, 10 mL, Intravenous, PRN  acetaminophen (TYLENOL) tablet 650 mg, 650 mg, Oral, Q6H PRN **OR** acetaminophen (TYLENOL) suppository 650 mg, 650 mg, Rectal, Q6H PRN  polyethylene glycol (GLYCOLAX) packet 17 g, 17 g, Oral, Daily PRN  promethazine (PHENERGAN) tablet 12.5 mg, 12.5 mg, Oral, Q6H PRN **OR** ondansetron (ZOFRAN) injection 4 mg, 4 mg, Intravenous, Q6H PRN  Allergies:   Latex; Codeine; and Pcn [penicillins]  Social History:    TOBACCO:   reports that she has been smoking cigarettes. She has been smoking about 0.50 packs per day. She has never used smokeless tobacco.  Family History:       Problem Relation Age of Onset    Cancer Father      REVIEW OF SYSTEMS:      ROS: A 10 point review of systems was conducted, significant findings as noted in HPI. All other systems negative.     PHYSICAL EXAM:      Vitals:    /66   Pulse 67   Temp 97.7 °F (36.5 °C) (Oral)   Resp 18   Ht 5' 6\" (1.676 m)   Wt 168 lb 3.4 oz (76.3 kg)   LMP 09/24/2013   SpO2 97%   BMI 27.15 kg/m²     LABS:   Recent Labs     06/12/20  0420   WBC 9.0   HGB 16.2*   HCT 47.3        Recent Labs

## 2020-06-12 NOTE — OP NOTE
Operative Note      Patient: Ovidio Overton  YOB: 1955  MRN: [id number]    Operative Note    Patient Name: Ovidio Overton  YOB: 1955  Date of Operation: 06/12/20    Pre-operative Diagnosis:   1. PAD with left foot rest     Post-operative Diagnosis:   1. PAD with left foot rest     Procedures Performed:  1. Ultrasound-guided access right common femoral artery  2. Right external iliac artery angioplasty 7 mm Fillmore balloon  3. Abdominal aortogram and left lower extremity arteriogram with third order selective catheterization  4. Balloon angioplasty left superficial femoral artery 4 mm x 220 mm Torsten balloon  5. Balloon angioplasty left popliteal artery 4 mm x 40 mm Torsten balloon  6. Balloon angioplasty left popliteal artery 4 mm x 60 mm Admiral drug-eluting balloon 4 residual dissection  7. Balloon angioplasty left tibioperoneal trunk and peroneal artery 2 mm x 220 mm Torsten balloon    Surgeon:  Brian Andino MD.    Anesthesia:  Monitored IV sedation  Under my supervision, Versed and fentanyl administered intravenously for moderate sedation. Pulse oximetry, heart rate, and BP were continuously monitored by an independent trained observer present. I spent 125 minutes of face to face sedation time with the patient. Total medications received:  Versed 5.5 mg IV and Fentanyl 275 mcg IV  Total time of monitored sedation:  5.5 minutes     Estimated Blood Loss:  10 mL. Indications for Procedure:  Ovidio Overton is a 71 y.o. female who was evaluated as an inpatient with worsening rest pain. She is a long-term smoker and also was noted to have a hemoglobin A1c of greater than 8 indicating uncontrolled diabetes. She was transferred from an outside facility with what was described as a cool foot and no pulses. However, on evaluation, she was found to have faint Doppler signals in the left foot.   Arteriogram was performed and she is now brought to the hybrid operating Dominant flow is through the peroneal artery which has tandem greater than 50% stenosis. Posterior tibial artery is occluded proximally but reconstitutes at the level of the medial malleolus by collateralization from the peroneal artery. Therapeutic Intervention:  The patient was systemically heparinized with an eventual total of 9000 units of IV heparin. Prior to treating the left lower extremity lesions, the right external iliac artery lesion was treated in order to allow passage of the destination sheath. The Curbsy wire was reinserted and passed up into the abdominal aorta. Using roadmapping imaging, the proximal external iliac artery was angioplastied with 7 mm x 40 mm balloon with good result. Follow-up imaging showed no residual stenosis. The femoral sheath was exchanged over a wire for a 6 Western Lexus Destination sheath, the tip was which was positioned in the distal Left external iliac artery. Using roadmapping imaging and a combination of 0.035 advantage Glidewire and Navicross catheter, the SFA and popliteal lesions were crossed. The wire was guided down into the proximal peroneal artery. The wire was then exchanged for a V-18 wire to better access and cross the peroneal artery. The wire was guided down to the distal portion. Using roadmapping imaging, a 2.0 mm balloon was inserted and balloon angioplasty was first performed on the entirety of the peroneal artery as well as the tibioperoneal trunk and the popliteal lesion. Once the peroneal artery and tibioperoneal trunk had been treated, the balloon was removed and exchanged for a 4 mm Torsten balloon which was used to balloon angioplasty the popliteal artery occlusion. Follow-up imaging showed some spasm of the peroneal outflow, though this remained patent. This was treated with nitroglycerin 300 mcg. There was also some resistance at the tibioperoneal trunk secondary to calcification.   The popliteal artery had some flow but was significantly dissected. Repeat angioplasty was performed with a 4 mm Torsten balloon at the popliteal artery, again with some improvement but proximally I believe there was flow-limiting dissection. Therefore, the popliteal artery was treated with an Admiral drug-eluting balloon. And Admiral 4 mm x 60 mm balloon was inserted and inflated across the popliteal artery for 2 minutes. Follow-up imaging showed significant improvement and no further flow-limiting dissection. Attention was then turned to the superficial femoral artery. Multiple tandem stenoses throughout the proximal and mid portions were treated with a long 4 mm Torsten balloon. Final imaging showed good relief of the stenoses and good outflow maintained through the anterior tibial and peroneal arteries. The Right femoral sheath was removed and closure performed with a Proglide device. Compression was continued for 5 minutes to ensure hemostasis. The patient tolerated the procedure well and was taken to the post-operative care unit in stable condition.      Carole Gonsalez MD    Electronically signed by Carole Gonsalez MD on 6/12/2020 at 2:40 PM

## 2020-06-12 NOTE — CARE COORDINATION
Case Management Assessment           Initial Evaluation                Date / Time of Evaluation: 6/12/2020 3:11 PM                 Assessment Completed by: Erlinda Ayala    Patient Name: Deepak Mock     YOB: 1955  Diagnosis: Diabetic foot infection ([facility] [E11.628, L08.9]  Diabetic foot infection ([facility] [S26.446, L08.9]     Date / Time: 6/11/2020  9:49 PM    Patient Admission Status: Inpatient    If patient is discharged prior to next notation, then this note serves as note for discharge by case management. Current PCP: Dalia Ruth MD  Clinic Patient: No    Chart Reviewed: Yes  Patient/ Family Interviewed: Yes    Initial assessment completed at bedside with: patient    Hospitalization in the last 30 days: No    Emergency Contacts:  Extended Emergency Contact Information  Primary Emergency Contact: Shimon Thompson  Address: [address] [place] [address] Phone: [phone]  Mobile Phone: [phone]  Relation: Spouse  Secondary Emergency Contact: Juan Ramon [address] Phone: [phone]  Mobile Phone: [phone]  Relation: Child    Advance Directives:   Code Status: Full Code        Financial  Payor: Andrés Schwartz / Plan: MEDICARE PART A AND B / Product Type: *No Product type* /     Pre-cert required for SNF: Yes    Pharmacy    [address], [address]. [facility]. - P [phone] - F [phone]  [facility] [address]  Phone: [phone] Fax: [phone]      Potential assistance Purchasing Medications:    Does Patient want to participate in local refill/ meds to beds program?:      Meds To Beds General Rules:  1. Can ONLY be done Monday- Friday between 8:30am-5pm  2. Prescription(s) must be in pharmacy by 3pm to be filled same day  3. Copy of patient's insurance/ prescription drug card and patient face sheet must be sent along with the prescription(s)  4.  Cost of Rx cannot be

## 2020-06-12 NOTE — CONSULTS
wheezes, no rhonchi  Cardiovascular: Regular rate and rhythm; hypertensive   Abdomen: Non-distended; soft; non-tender; normoactive bowel sounds; no guarding, no rigidity, no rebound  Skin: Erythema of the distal LLE with increased temperature vs the RLE  Extremities: No edema; no cyanosis, motor function and sensation intact in bilateral LEs  Neuro: A&Ox3; no focal deficits; sensation intact    Pulses    F P PT DP   R + + + +   L + + -/+ -/+    +, -/+ ,-/-     Laboratory Results:    CBC: No results for input(s): WBC, HGB, HCT, MCV, PLT in the last 72 hours. BMP:   Recent Labs     06/11/20  2230      K 4.5      CO2 24   BUN 16   CREATININE 0.6     PT/INR: No results for input(s): PROTIME, INR in the last 72 hours. APTT: No results for input(s): APTT in the last 72 hours. Liver Profile:  Lab Results   Component Value Date    AST 21 06/11/2020    ALT 12 06/11/2020    BILIDIR <0.2 11/18/2014    BILITOT 0.4 06/11/2020    ALKPHOS 89 06/11/2020     Lab Results   Component Value Date    CHOL 246 04/04/2017    HDL 30 04/04/2017    TRIG 463 04/04/2017     UA: No results found for: NITRITE, COLORU, PHUR, LABCAST, WBCUA, RBCUA, MUCUS, TRICHOMONAS, YEAST, BACTERIA, CLARITYU, SPECGRAV, LEUKOCYTESUR, UROBILINOGEN, BILIRUBINUR, BLOODU, GLUCOSEU, AMORPHOUS    Imaging:   VL DUP LOWER EXTREMITY ARTERIES BILATERAL    (Results Pending)     Assessment/Plan: This is a 71 y.o. female who presented secondary to concern for the inability of the provider at the referring facility to detect distal pulses in the LLE.  The patient does have a very faintly dopplerable L DP and dopplerable L PT; therefore, pursuit of further evaluation would be beneficial.     - Duplex vs angiogram      - Patient will be kept NPO tonight   - COVID negative (6/12)  - Will discuss further with Dr. Velma Christie MD, MPH  PGY-1 General Surgery  06/12/20  1:15 AM  [phone]

## 2020-06-12 NOTE — H&P
[Penicillins]        PSHx:   Past Surgical History:   Procedure Laterality Date    CARPAL TUNNEL RELEASE      CHOLECYSTECTOMY       [address] CATARACT EXTRACAP,INSERT LENS Right 10/25/2018    PHACOEMULSIFICATION OF CATARACT RIGHT EYE WITH INTRAOCULAR LENS IMPLANT performed by Yousuf Goodman MD at [address] Left 11/1/2018    PHACOEMULSIFICATION OF CATARACT LEFT EYE WITH INTRAOCULAR LENS IMPLANT performed by Yousuf Goodman MD at [address] [facility]         Social Hx:   Social History     Socioeconomic History    Marital status:      Spouse name: Not on file    Number of children: Not on file    Years of education: Not on file    Highest education level: Not on file   Occupational History    Not on file   Social Needs    Financial resource strain: Not on file    Food insecurity     Worry: Not on file     Inability: Not on file    Transportation needs     Medical: Not on file     Non-medical: Not on file   Tobacco Use    Smoking status: Current Every Day Smoker     Packs/day: 0.50     Types: Cigarettes    Smokeless tobacco: Never Used   Substance and Sexual Activity    Alcohol use: No    Drug use: No    Sexual activity: Not Currently   Lifestyle    Physical activity     Days per week: Not on file     Minutes per session: Not on file    Stress: Not on file   Relationships    Social connections     Talks on phone: Not on file     Gets together: Not on file     Attends Religion service: Not on file     Active member of club or organization: Not on file     Attends meetings of clubs or organizations: Not on file     Relationship status: Not on file    Intimate partner violence     Fear of current or ex partner: Not on file     Emotionally abused: Not on file     Physically abused: Not on file     Forced sexual activity: Not on file   Other Topics Concern    Not on file   Social History Narrative    Not on file       Family Panels:  Recent Labs     06/11/20 2230   BUN 16   CREATININE 0.6      K 4.5   CO2 24      AST 21   ALT 12   PROT 7.3       Recent Labs     06/11/20  2230   ALKPHOS 89   ALT 12   AST 21   PROT 7.3   BILITOT 0.4   LABALBU 4.4        Recent Labs     06/11/20  2230 06/11/20  2337   GLUCOSE 120*  --    POCGLU  --  125*        Lab Results   Component Value Date    FERRITIN 300 (H) 07/01/2013    FOLATE 19.53 07/01/2013       Lab Results   Component Value Date    TSH 0.61 06/16/2015       Imaging:   No results found. Assessment/Plan:     Genny Houser, 71 y.o. female w/ DM, HLP, Hypothyroidism, Smoker. Admitted for left foot pain. Suspicion for peripheral arterial disease as etiology. Left foot pain - most likely peripheral arterial disease   Patient is a smoker (0.5pack/day for 20 years), diabetic and has hyperlipidemia, which are all risk factors for peripheral arterial disease. Clinical findings are suggestive of arterial occlusion. In addition, patient has been on canagliflozin which has been associated increased risk of foot and leg amputations.    - Vascular Surgery consulted   - ERICKA studies in AM   - increased aspirin to 325mg daily   - will start on atorvastatin   - ordered lipid profile   - discontinue canagliflozin (would discontinue on discharge)       CHRONIC ISSUES  Diabetes: low dose sliding scale (home regimen: metformin, canagliflozin)   Hypothyroidism: continue levothyroxine   Smoker: nicotine patch; encourage to quit     FEN - NPO at midnight, in the event patient needs surgical intervention   PPx - lovenox  CODE -Full Code    DISPO - from home     I will discuss the patient with Dr. Brenda Sanchez MD  Internal Medicine, PGY-3   6/12/2020, 2:43 AM

## 2020-06-13 VITALS
RESPIRATION RATE: 16 BRPM | WEIGHT: 167.11 LBS | DIASTOLIC BLOOD PRESSURE: 72 MMHG | BODY MASS INDEX: 26.86 KG/M2 | SYSTOLIC BLOOD PRESSURE: 153 MMHG | HEIGHT: 66 IN | HEART RATE: 69 BPM | OXYGEN SATURATION: 99 % | TEMPERATURE: 97.8 F

## 2020-06-13 LAB
GLUCOSE BLD-MCNC: 187 MG/DL (ref 70–99)
HCT VFR BLD CALC: 44.4 % (ref 36–48)
HEMOGLOBIN: 15.3 G/DL (ref 12–16)
MCH RBC QN AUTO: 31.1 PG (ref 26–34)
MCHC RBC AUTO-ENTMCNC: 34.5 G/DL (ref 31–36)
MCV RBC AUTO: 90.3 FL (ref 80–100)
PDW BLD-RTO: 13.7 % (ref 12.4–15.4)
PERFORMED ON: ABNORMAL
PLATELET # BLD: 208 K/UL (ref 135–450)
PMV BLD AUTO: 7.7 FL (ref 5–10.5)
RBC # BLD: 4.91 M/UL (ref 4–5.2)
SEDIMENTATION RATE, ERYTHROCYTE: 13 MM/HR (ref 0–30)
WBC # BLD: 7.8 K/UL (ref 4–11)

## 2020-06-13 PROCEDURE — 6370000000 HC RX 637 (ALT 250 FOR IP): Performed by: SURGERY

## 2020-06-13 PROCEDURE — 85027 COMPLETE CBC AUTOMATED: CPT

## 2020-06-13 PROCEDURE — 2580000003 HC RX 258: Performed by: STUDENT IN AN ORGANIZED HEALTH CARE EDUCATION/TRAINING PROGRAM

## 2020-06-13 PROCEDURE — 85652 RBC SED RATE AUTOMATED: CPT

## 2020-06-13 PROCEDURE — 6370000000 HC RX 637 (ALT 250 FOR IP): Performed by: STUDENT IN AN ORGANIZED HEALTH CARE EDUCATION/TRAINING PROGRAM

## 2020-06-13 PROCEDURE — 6360000002 HC RX W HCPCS: Performed by: STUDENT IN AN ORGANIZED HEALTH CARE EDUCATION/TRAINING PROGRAM

## 2020-06-13 PROCEDURE — 2580000003 HC RX 258: Performed by: SURGERY

## 2020-06-13 PROCEDURE — 6360000002 HC RX W HCPCS: Performed by: SURGERY

## 2020-06-13 RX ORDER — POVIDONE-IODINE 10 MG/G
OINTMENT TOPICAL PRN
Status: DISCONTINUED | OUTPATIENT
Start: 2020-06-13 | End: 2020-06-13 | Stop reason: HOSPADM

## 2020-06-13 RX ORDER — CLOPIDOGREL BISULFATE 75 MG/1
75 TABLET ORAL DAILY
Qty: 30 TABLET | Refills: 3 | Status: SHIPPED | OUTPATIENT
Start: 2020-06-14 | End: 2021-03-11

## 2020-06-13 RX ORDER — ATORVASTATIN CALCIUM 40 MG/1
40 TABLET, FILM COATED ORAL NIGHTLY
Qty: 30 TABLET | Refills: 3 | Status: SHIPPED | OUTPATIENT
Start: 2020-06-13

## 2020-06-13 RX ADMIN — LEVOTHYROXINE SODIUM 75 MCG: 0.07 TABLET ORAL at 05:30

## 2020-06-13 RX ADMIN — Medication 10 ML: at 08:48

## 2020-06-13 RX ADMIN — ENOXAPARIN SODIUM 40 MG: 40 INJECTION SUBCUTANEOUS at 08:48

## 2020-06-13 RX ADMIN — CLOPIDOGREL BISULFATE 75 MG: 75 TABLET ORAL at 08:48

## 2020-06-13 RX ADMIN — ASPIRIN 81 MG: 81 TABLET, COATED ORAL at 08:48

## 2020-06-13 RX ADMIN — INSULIN LISPRO 1 UNITS: 100 INJECTION, SOLUTION INTRAVENOUS; SUBCUTANEOUS at 08:47

## 2020-06-13 RX ADMIN — VANCOMYCIN HYDROCHLORIDE 1250 MG: 10 INJECTION, POWDER, LYOPHILIZED, FOR SOLUTION INTRAVENOUS at 05:31

## 2020-06-13 ASSESSMENT — PAIN SCALES - GENERAL
PAINLEVEL_OUTOF10: 0

## 2020-06-13 NOTE — PROGRESS NOTES
4 Eyes Admission Assessment     I agree as the admission nurse that 2 RN's have performed a thorough Head to Toe Skin Assessment on the patient. ALL assessment sites listed below have been assessed on admission. Areas assessed by both nurses: Ruchi VALLES  [x]   Head, Face, and Ears   [x]   Shoulders, Back, and Chest  [x]   Arms, Elbows, and Hands   [x]   Coccyx, Sacrum, and Ischum  [x]   Legs, Feet, and Heels        Does the Patient have Skin Breakdown?   YES  Necrotic area to underside of L great toe           Jan Prevention initiated:  NO   Wound Care Orders initiated:  YES      [facility] nurse consulted for Pressure Injury (Stage 3,4, Unstageable, DTI, NWPT, and Complex wounds):  YES      Nurse 1 eSignature: Electronically signed by Violet Perez RN on 6/11/2020 at 10:23 PM      **SHARE this note so that the co-signing nurse is able to place an eSignature**    Nurse 2 eSignature: Electronically signed by Carol Lee RN on 6/12/20 at 1:15 AM EDT
AM assessment charted. BP (!) 153/72   Pulse 69   Temp 97.8 °F (36.6 °C) (Oral)   Resp 16   Ht 5' 6\" (1.676 m)   Wt 167 lb 1.7 oz (75.8 kg)   LMP 09/24/2013   SpO2 99%   BMI 26.97 kg/m²   Pt denies any pain or SOB. Will continue to monitor.
Perfect served on call resident re: running b/p's
Podiatric Surgery Daily Progress Note      Admit Date: 6/11/2020                                   Code:Full Code    Patient seen and examined, labs and records reviewed    Subjective:     Patient seen and examined at bedside this a.m. Patient denies any overnight acute event. Patient denies f/c/n/v/sob/cp. Patient states that she can feel her left foot more and without any pain. Objective     BP (!) 149/78   Pulse 66   Temp 98 °F (36.7 °C) (Oral)   Resp 16   Ht 5' 6\" (1.676 m)   Wt 167 lb 1.7 oz (75.8 kg)   LMP 09/24/2013   SpO2 97%   BMI 26.97 kg/m²      I/O:    Intake/Output Summary (Last 24 hours) at 6/13/2020 0718  Last data filed at 6/13/2020 0600  Gross per 24 hour   Intake 3646 ml   Output 2050 ml   Net 1596 ml              Wt Readings from Last 3 Encounters:   06/13/20 167 lb 1.7 oz (75.8 kg)   01/17/19 167 lb 15.9 oz (76.2 kg)   11/01/18 168 lb (76.2 kg)       LABS:    Recent Labs     06/12/20  0420 06/13/20  0350   WBC 9.0 7.8   HGB 16.2* 15.3   HCT 47.3 44.4    208        Recent Labs     06/12/20  0420      K 3.9      CO2 23   BUN 17   CREATININE 0.6        Recent Labs     06/11/20  2230   PROT 7.3             LOWER EXTREMITY EXAMINATION    Dressing to left LE intact. No strikethrough drainage noted to the external dressing. Scant sanguinous drainage noted to the internal dressing layer. VASCULAR: DP and PT pulses are nonpalpable b/l. Upon hand-held Doppler monophasic signals at the DP and PT B/L LE.  CFT is brisk to the digits of the right foot. CFT less than 5 seconds and improved since yesterday's angiogram.  Skin temperature is warm to warm from proximal to distal with no focal calor noted. Focal redness noted dorsal aspect of the digits and a moccasin distribution to the plantar aspect of the left foot. No dependent rubor noted LLE. No edema noted. No pain with calf compression b/l.     NEUROLOGIC: Gross and epicritic sensation is diminished b/l.
Reviewed discharge instructions with pt, questioned gabapentin, MD notified and addresses with pt. IV and tele removed. Charge RN escorted pt to private vehicle with all belongings and discharge papers.
Surgery Post-Op Day #1 Note    CC: PAD with left foot rest     Procedure: LLE angioplasty     SUBJECTIVE:   Afebrile, hemodynamically stable this am, hypertensive yesterday evening resolved, pain controlled. Denies nausea or emesis. Hungry. Leg feels much better, no rest pain last night. ROS: A 14 point review of systems was conducted, significant findings as noted in HPI. All other systems negative. OBJECTIVE:     Exam:   Vitals:    06/13/20 0200 06/13/20 0351 06/13/20 0530 06/13/20 0600   BP:  (!) 149/78     Pulse: 66 67  66   Resp:  16     Temp:  98 °F (36.7 °C)     TempSrc:  Oral     SpO2:  97%     Weight:   167 lb 1.7 oz (75.8 kg)    Height:           General appearance: alert, no acute distress, grooming appropriate  Neuro: A&Ox3, no focal deficits, sensation intact  HEENT: trachea midline  Chest/Lungs: normal effort, no accessory muscle use, on RA  Cardiovascular: RRR  Abdomen: soft, non tender, non distended, groin: no hematoma  Extremities: no edema, no cyanosis    Pulses    F PT DP   R + -/+ -/+   L + -/+ -/+    +, -/+ ,-/-       Procedures Performed:  1.   Ultrasound-guided access right common femoral artery  2.  Right external iliac artery angioplasty 7 mm Sheldon balloon  3.  Abdominal aortogram and left lower extremity arteriogram with third order selective catheterization  4.  Balloon angioplasty left superficial femoral artery 4 mm x 220 mm Torsten balloon  5.  Balloon angioplasty left popliteal artery 4 mm x 40 mm Torsten balloon  6.  Balloon angioplasty left popliteal artery 4 mm x 60 mm Admiral drug-eluting balloon 4 residual dissection  7.  Balloon angioplasty left tibioperoneal trunk and peroneal artery 2 mm x 220 mm Torsten balloon    ASSESSMENT/PLAN:   This is a 71 y.o. female with left foot rest pain consistent with PAD s/p angioplasty POD #1.    - Diet: carb control  - continue ASA, plavix   - continue statin  - encourage OOB, ambulation  - encourage IS and deep breathing  - ppx:
normal.      Pupils: Pupils are equal, round, and reactive to light. Cardiovascular:      Rate and Rhythm: Normal rate. Pulses: Normal pulses. Heart sounds: Normal heart sounds. No murmur. No friction rub. Pulmonary:      Effort: Pulmonary effort is normal. No respiratory distress. Breath sounds: Normal breath sounds. Abdominal:      General: Abdomen is flat. Bowel sounds are normal. There is no distension. Palpations: Abdomen is soft. Musculoskeletal:      Right lower leg: Edema present. Left lower leg: Edema present. Skin:     General: Skin is warm and dry. Findings: Erythema (Right lower leg ) present. Comments: Erythematous with cool distal RLL. She also has a ulcer on her plantar side of her R great toe. She has non palpable pulse on  Dorsalis pedis and posterior tibial    Neurological:      General: No focal deficit present. Mental Status: She is alert and oriented to person, place, and time. Mental status is at baseline. Cranial Nerves: No cranial nerve deficit. Sensory: No sensory deficit. Motor: No weakness. Coordination: Coordination normal.      Gait: Gait normal.      Deep Tendon Reflexes: Reflexes normal.   Psychiatric:         Mood and Affect: Mood normal.         Behavior: Behavior normal.         Thought Content: Thought content normal.         Judgment: Judgment normal.           LABS:    CBC:   Recent Labs     06/12/20  0420   WBC 9.0   HGB 16.2*   HCT 47.3      MCV 90.7     Renal:    Recent Labs     06/11/20  2230 06/12/20  0420    139   K 4.5 3.9    104   CO2 24 23   BUN 16 17   CREATININE 0.6 0.6   GLUCOSE 120* 181*   CALCIUM 10.2 9.9   ANIONGAP 13 12     Hepatic:   Recent Labs     06/11/20  2230   AST 21   ALT 12   BILITOT 0.4   PROT 7.3   LABALBU 4.4   ALKPHOS 89     Troponin: No results for input(s): TROPONINI in the last 72 hours. BNP: No results for input(s): BNP in the last 72 hours.   Lipids: No

## 2020-06-15 LAB
CHOLESTEROL, TOTAL: 272 MG/DL (ref 0–199)
HDLC SERPL-MCNC: 31 MG/DL (ref 40–60)
LDL CHOLESTEROL CALCULATED: ABNORMAL MG/DL
LDL CHOLESTEROL DIRECT: 175 MG/DL
TRIGL SERPL-MCNC: 401 MG/DL (ref 0–150)
VLDLC SERPL CALC-MCNC: ABNORMAL MG/DL

## 2020-06-30 ENCOUNTER — OFFICE VISIT (OUTPATIENT)
Dept: VASCULAR SURGERY | Age: 65
End: 2020-06-30
Payer: MEDICARE

## 2020-06-30 VITALS
HEART RATE: 79 BPM | WEIGHT: 173 LBS | DIASTOLIC BLOOD PRESSURE: 83 MMHG | SYSTOLIC BLOOD PRESSURE: 137 MMHG | TEMPERATURE: 97.6 F | HEIGHT: 66 IN | BODY MASS INDEX: 27.8 KG/M2

## 2020-06-30 PROCEDURE — 99214 OFFICE O/P EST MOD 30 MIN: CPT | Performed by: SURGERY

## 2020-06-30 PROCEDURE — 1090F PRES/ABSN URINE INCON ASSESS: CPT | Performed by: SURGERY

## 2020-06-30 PROCEDURE — G8427 DOCREV CUR MEDS BY ELIG CLIN: HCPCS | Performed by: SURGERY

## 2020-06-30 PROCEDURE — 1111F DSCHRG MED/CURRENT MED MERGE: CPT | Performed by: SURGERY

## 2020-06-30 PROCEDURE — 1036F TOBACCO NON-USER: CPT | Performed by: SURGERY

## 2020-06-30 PROCEDURE — G8400 PT W/DXA NO RESULTS DOC: HCPCS | Performed by: SURGERY

## 2020-06-30 PROCEDURE — G8417 CALC BMI ABV UP PARAM F/U: HCPCS | Performed by: SURGERY

## 2020-06-30 PROCEDURE — 3017F COLORECTAL CA SCREEN DOC REV: CPT | Performed by: SURGERY

## 2020-06-30 PROCEDURE — 1123F ACP DISCUSS/DSCN MKR DOCD: CPT | Performed by: SURGERY

## 2020-06-30 PROCEDURE — 4040F PNEUMOC VAC/ADMIN/RCVD: CPT | Performed by: SURGERY

## 2020-06-30 RX ORDER — SITAGLIPTIN AND METFORMIN HYDROCHLORIDE 100; 1000 MG/1; MG/1
TABLET, FILM COATED, EXTENDED RELEASE ORAL DAILY
COMMUNITY

## 2020-06-30 RX ORDER — GLIPIZIDE 5 MG/1
5 TABLET ORAL
COMMUNITY
End: 2021-03-11

## 2020-06-30 NOTE — PROGRESS NOTES
[facility]) Vascular Surgery--Coleman [facility], [address], [facility],  [address]    Follow-up    Referring Provider:  Janet Diaz MD     Patient Name: Leopoldo Sparrow  YOB: 1955  Date: 06/30/20    History:  Leopoldo Sparrow is a 71 y.o. female who was admitted on 6/12/2020 for new onset left foot rest pain. She underwent right external iliac artery angioplasty, and left SFA, popliteal and tibioperoneal trunk angioplasty with good result. She was counseled extensively regarding smoking cessation. She has stopped smoking since her recent hospitalization. She is on aspirin, Plavix, statin. She is seeing Dr. Celeste Carnes today for left first toe ulceration. She states that she is diabetic and her blood sugars run in the 200s. Hemoglobin A1c in the hospital was 8.0. Today, she denies any claudication pain she complains of shooting pain and neuropathy pain in the feet. Vital Signs  Ht 5' 6\" (1.676 m)   LMP 09/24/2013   BMI 26.97 kg/m²     Physical Examination:  General:  alert and oriented to person, place and time, well-developed and well-nourished, in no acute distress  Heart: Normal S1 and S2.  Regular rhythm. No murmurs, gallops, or rubs. Lungs:  clear to auscultation without wheezes or rales  Abdomen: soft, nontender, no pulsatile mass palpable. Extremities: Bilateral lower extremities are warm and well-perfused. 1+ nonpitting edema of the left foot. Brisk cap refill bilaterally. She has peeling superficial layer of skin on the left foot. She has a crack ulceration in the crevice at the base of the plantar surface of the left first toe. The area is tender to touch. No crepitance. No drainage. No surrounding erythema. Vascular exam:   Pulses:    femoral DP PT   RIGHT  2 monophasic monophasic   LEFT 2 Strong biphasic monophasic       Impression:  1. Peripheral artery disease with recent left lower extremity angioplasty  2. Left first toe ulceration  3.

## 2020-07-07 ENCOUNTER — HOSPITAL ENCOUNTER (OUTPATIENT)
Dept: VASCULAR LAB | Age: 65
Discharge: HOME | End: 2020-07-07
Payer: MEDICARE

## 2020-07-07 PROCEDURE — 93925 LOWER EXTREMITY STUDY: CPT

## 2020-10-15 ENCOUNTER — TELEPHONE (OUTPATIENT)
Dept: SURGERY | Age: 65
End: 2020-10-15

## 2021-02-03 DIAGNOSIS — I73.9 PAD (PERIPHERAL ARTERY DISEASE) (HCC): Primary | ICD-10-CM

## 2021-03-11 ENCOUNTER — OFFICE VISIT (OUTPATIENT)
Dept: VASCULAR SURGERY | Age: 66
End: 2021-03-11
Payer: MEDICARE

## 2021-03-11 ENCOUNTER — PROCEDURE VISIT (OUTPATIENT)
Dept: VASCULAR SURGERY | Age: 66
End: 2021-03-11
Payer: MEDICARE

## 2021-03-11 VITALS
HEIGHT: 66 IN | BODY MASS INDEX: 27.8 KG/M2 | SYSTOLIC BLOOD PRESSURE: 159 MMHG | WEIGHT: 173 LBS | HEART RATE: 71 BPM | TEMPERATURE: 97 F | DIASTOLIC BLOOD PRESSURE: 88 MMHG

## 2021-03-11 DIAGNOSIS — Z72.0 TOBACCO ABUSE: ICD-10-CM

## 2021-03-11 DIAGNOSIS — I73.9 PAD (PERIPHERAL ARTERY DISEASE) (HCC): ICD-10-CM

## 2021-03-11 DIAGNOSIS — I73.9 PAD (PERIPHERAL ARTERY DISEASE) (HCC): Primary | ICD-10-CM

## 2021-03-11 PROCEDURE — 93925 LOWER EXTREMITY STUDY: CPT | Performed by: SURGERY

## 2021-03-11 PROCEDURE — G8484 FLU IMMUNIZE NO ADMIN: HCPCS | Performed by: SURGERY

## 2021-03-11 PROCEDURE — 3017F COLORECTAL CA SCREEN DOC REV: CPT | Performed by: SURGERY

## 2021-03-11 PROCEDURE — 1090F PRES/ABSN URINE INCON ASSESS: CPT | Performed by: SURGERY

## 2021-03-11 PROCEDURE — 1123F ACP DISCUSS/DSCN MKR DOCD: CPT | Performed by: SURGERY

## 2021-03-11 PROCEDURE — G8417 CALC BMI ABV UP PARAM F/U: HCPCS | Performed by: SURGERY

## 2021-03-11 PROCEDURE — 1036F TOBACCO NON-USER: CPT | Performed by: SURGERY

## 2021-03-11 PROCEDURE — G8400 PT W/DXA NO RESULTS DOC: HCPCS | Performed by: SURGERY

## 2021-03-11 PROCEDURE — G8427 DOCREV CUR MEDS BY ELIG CLIN: HCPCS | Performed by: SURGERY

## 2021-03-11 PROCEDURE — 99214 OFFICE O/P EST MOD 30 MIN: CPT | Performed by: SURGERY

## 2021-03-11 PROCEDURE — 4040F PNEUMOC VAC/ADMIN/RCVD: CPT | Performed by: SURGERY

## 2021-03-11 RX ORDER — GABAPENTIN 600 MG/1
600 TABLET ORAL 3 TIMES DAILY
COMMUNITY

## 2021-03-11 RX ORDER — INSULIN GLARGINE 100 [IU]/ML
INJECTION, SOLUTION SUBCUTANEOUS NIGHTLY
COMMUNITY

## 2021-03-11 RX ORDER — CLONIDINE HYDROCHLORIDE 0.1 MG/1
0.1 TABLET ORAL 2 TIMES DAILY
COMMUNITY

## 2021-03-11 RX ORDER — PANTOPRAZOLE SODIUM 40 MG/1
40 TABLET, DELAYED RELEASE ORAL DAILY
COMMUNITY

## 2021-03-11 RX ORDER — HYDROCHLOROTHIAZIDE 12.5 MG/1
12.5 CAPSULE, GELATIN COATED ORAL DAILY
COMMUNITY

## 2021-03-12 NOTE — PROGRESS NOTES
Genny Houser (:  1955) is a 76 y.o. female,Established patient, here for evaluation of the following chief complaint(s):  Follow-up (6 month follow up LE arterial duplex at 1230 )      ASSESSMENT/PLAN:  1. PAD (peripheral artery disease) (HCC)  -     VL Lower Extremity Arteries Bilateral; Future  2. Tobacco abuse  Strongly encouraged smoking cessation. Discussed risks of continued use. Repeat arterial duplex in 6 months. No intervention at present. She has no symptoms currently. Return in about 6 months (around 2021). SUBJECTIVE/OBJECTIVE:  She was admitted on 2020 for new onset left foot rest pain. She underwent right external iliac artery angioplasty, and left SFA, popliteal and tibioperoneal trunk angioplasty with good result. She now returns for 6month follow up duplex. She denies rest pain. States her left first toe ulcer is healed. Denies claudication, but states she is relatively sedentary, especially given the pandemic social restrictions. She continues to smoke. She underwent the following study today, images of which I personally reviewed. Unfortunately, this shows a decline in her left ERICKA. VL Lower Extremity Arteries Bilateral 3/11/2021  Summary      The right ERICKA is abnormal at 0.51. Monophasic flow at the common femoral  Chastity Ronde suggests hemodynamically significant aortoiliac inflow disease. There   is >50% stenosis of the mid-SFA. Tibial artery disease is present.      The left ERICKA is abnormal at 0.56. There is no evidence of significant   aortoiliac inflow disease. There is >50% stenosis in the left   femoral-popliteal segments. There is significant tibial artery disease.      In comparison to the previous study, the right ERICKA has dropped from . 54 to   .51 and the left ERICKA has dropped from . 74 to .56 . Physical Exam  She is awake, alert, oriented, appears in normal state of health.   Heart:  RRR no m/r/g  Lungs:  CTA B no w/r/r  Ext:  BLE warm and well-perfused with no c/c/e. No ulcerations. No swelling. No palpable cords. Vascular:  R femoral faintly palpable only; Left femoral 2+; B DP/PT are monophasic by doppler only. Skin:  Warm and dry, no rashes or lesions. Cap refill 2+. On this date 3/11/2021 I have spent 30 minutes reviewing previous notes, test results and face to face with the patient discussing the diagnosis and importance of compliance with the treatment plan as well as documenting on the day of the visit. An electronic signature was used to authenticate this note.     --Katha Denver, MD

## 2021-09-14 NOTE — PROGRESS NOTES
[facility]) Vascular Surgery--Coleman Greenwood, [address] [facility], [facility], [address] [place] [address] Rd    Follow-up    Referring Provider:  Irma Primrose, MD     Patient Name: Karel Cheek  YOB: 1955  Date: 09/16/21    History:  Karel Cheek is a 76 y.o. female being followed for left foot pain. She was admitted on 6/12/2020 for new onset left foot rest pain. She underwent right external iliac artery angioplasty, and left SFA, popliteal and tibioperoneal trunk angioplasty with good result. Returns today for repeat duplex and follow up. Denies claudication. Endorses low back pain bilaterally--improved with leaning forward. Denies current tobacco use. I personally reviewed images of the following:  VL DUP LOWER EXTREMITY ARTERIES BILATERAL  9/16/2021   Moderate arterial insufficiency of the bilateral lower extremities with ABIs    slightly decreased from prior study.    Duplex indicates multi-level disease, including aortoiliac inflow disease,    femoral popliteal and tibial artery stenosis. Vital Signs  BP (!) 148/69   Pulse 61   Temp 96.8 °F (36 °C)   Ht 5' 6\" (1.676 m)   Wt 205 lb 6.4 oz (93.2 kg)   LMP 09/24/2013   BMI 33.15 kg/m²     Physical Examination:  Vascular:  R femoral faintly palpable only; Left femoral 2+; B DP/PT are monophasic by doppler only. Skin:  Warm and dry, no rashes or lesions. Cap refill 2+. Extremities:  Warm and well-perfused with no discoloration or coolness. No evidence of limb threatening ischemia. Impression:  Stable PAD without significant claudication symptoms. Tobacco abuse--states she has stopped smoking    Plan:  Continue smoking cessation. Follow up in 6 months with repeat duplex.

## 2021-09-16 ENCOUNTER — PROCEDURE VISIT (OUTPATIENT)
Dept: VASCULAR SURGERY | Age: 66
End: 2021-09-16

## 2021-09-16 ENCOUNTER — OFFICE VISIT (OUTPATIENT)
Dept: VASCULAR SURGERY | Age: 66
End: 2021-09-16
Payer: MEDICARE

## 2021-09-16 VITALS
WEIGHT: 205.4 LBS | DIASTOLIC BLOOD PRESSURE: 69 MMHG | TEMPERATURE: 96.8 F | HEIGHT: 66 IN | BODY MASS INDEX: 33.01 KG/M2 | SYSTOLIC BLOOD PRESSURE: 148 MMHG | HEART RATE: 61 BPM

## 2021-09-16 DIAGNOSIS — I73.9 PAD (PERIPHERAL ARTERY DISEASE) (HCC): ICD-10-CM

## 2021-09-16 DIAGNOSIS — I73.9 PAD (PERIPHERAL ARTERY DISEASE) (HCC): Primary | ICD-10-CM

## 2021-09-16 PROCEDURE — 1090F PRES/ABSN URINE INCON ASSESS: CPT | Performed by: SURGERY

## 2021-09-16 PROCEDURE — G8427 DOCREV CUR MEDS BY ELIG CLIN: HCPCS | Performed by: SURGERY

## 2021-09-16 PROCEDURE — G8400 PT W/DXA NO RESULTS DOC: HCPCS | Performed by: SURGERY

## 2021-09-16 PROCEDURE — G8417 CALC BMI ABV UP PARAM F/U: HCPCS | Performed by: SURGERY

## 2021-09-16 PROCEDURE — 4040F PNEUMOC VAC/ADMIN/RCVD: CPT | Performed by: SURGERY

## 2021-09-16 PROCEDURE — 3017F COLORECTAL CA SCREEN DOC REV: CPT | Performed by: SURGERY

## 2021-09-16 PROCEDURE — 1123F ACP DISCUSS/DSCN MKR DOCD: CPT | Performed by: SURGERY

## 2021-09-16 PROCEDURE — 1036F TOBACCO NON-USER: CPT | Performed by: SURGERY

## 2021-09-16 PROCEDURE — 99213 OFFICE O/P EST LOW 20 MIN: CPT | Performed by: SURGERY

## 2021-09-16 RX ORDER — AMLODIPINE BESYLATE 10 MG/1
10 TABLET ORAL DAILY
COMMUNITY

## 2021-09-16 RX ORDER — PRASUGREL 10 MG/1
10 TABLET, FILM COATED ORAL DAILY
COMMUNITY

## 2021-09-16 RX ORDER — ISOSORBIDE MONONITRATE 30 MG/1
30 TABLET, EXTENDED RELEASE ORAL DAILY
COMMUNITY

## 2023-09-06 ENCOUNTER — HOSPITAL ENCOUNTER (OUTPATIENT)
Age: 68
Setting detail: SPECIMEN
Discharge: HOME | End: 2023-09-06
Payer: MEDICARE

## 2023-09-06 PROCEDURE — 84439 ASSAY OF FREE THYROXINE: CPT

## 2023-09-06 PROCEDURE — 84443 ASSAY THYROID STIM HORMONE: CPT

## 2023-09-07 LAB
T4 FREE SERPL-MCNC: 1.3 NG/DL (ref 0.9–1.8)
TSH SERPL DL<=0.005 MIU/L-ACNC: 0.25 UIU/ML (ref 0.27–4.2)

## (undated) DEVICE — SET ADMIN PRIMING 7ML L30IN 7.35LB 20 GTT 2ND RLER CLMP

## (undated) DEVICE — 3M™ TEGADERM™ TRANSPARENT FILM DRESSING FRAME STYLE, 1624W, 2-3/8 IN X 2-3/4 IN (6 CM X 7 CM), 100/CT 4CT/CASE: Brand: 3M™ TEGADERM™

## (undated) DEVICE — SILICONE I/A TIP STRAIGHT: Brand: ALCON

## (undated) DEVICE — PATIENT CARE KIT EYE POST OP

## (undated) DEVICE — Device

## (undated) DEVICE — GLOVE SURG SZ 75 L12IN FNGR THK94MIL STD WHT LTX FREE

## (undated) DEVICE — SOLUTION IV 1000ML LAC RINGERS PH 6.5 INJ USP VIAFLX PLAS

## (undated) DEVICE — SURGICAL PROCEDURE PACK EYE ANDRSN

## (undated) DEVICE — CATHETER IV 20GA L1.25IN PNK FEP SFTY STR HUB RADPQ DISP

## (undated) DEVICE — SET GRAV VENT NVENT CK VLV 3 NDL FREE PRT 10 GTT

## (undated) DEVICE — SOLUTION IRRIG 250ML STRL H2O PLAS POUR BTL USP

## (undated) DEVICE — AIRLIFE™ NASAL OXYGEN CANNULA CURVED, FLARED TIP, WITH 7 FEET (2.1 M) CRUSH RESISTANT TUBING, OVER-THE-EAR STYLE: Brand: AIRLIFE™

## (undated) DEVICE — MICROSURGICAL INSTRUMENT ANTERIOR CHAMBER CANNULA 30GA: Brand: ALCON

## (undated) DEVICE — GLOVE,SURG,SENSICARE,ALOE,LF,PF,7: Brand: MEDLINE

## (undated) DEVICE — ELECTRODE ECG MONITR FOAM TEAR DROP ADLT RED

## (undated) DEVICE — METER ACCU-CHEK INFORM BASE GLUCOSE

## (undated) DEVICE — GLOVE SURG SZ 65 L12IN FNGR THK94MIL STD WHT LTX FREE